# Patient Record
Sex: FEMALE | Race: WHITE | URBAN - NONMETROPOLITAN AREA
[De-identification: names, ages, dates, MRNs, and addresses within clinical notes are randomized per-mention and may not be internally consistent; named-entity substitution may affect disease eponyms.]

---

## 2019-01-30 ENCOUNTER — IMPORTED ENCOUNTER (OUTPATIENT)
Dept: URBAN - NONMETROPOLITAN AREA CLINIC 1 | Facility: CLINIC | Age: 34
End: 2019-01-30

## 2019-01-30 PROBLEM — R79.0: Noted: 2019-01-30

## 2019-01-30 PROCEDURE — 99203 OFFICE O/P NEW LOW 30 MIN: CPT

## 2019-01-30 NOTE — PATIENT DISCUSSION
Pt referred for eval of love fleish ring. Pt has elevated copper blood levels. WSB saw no evidence love fleish ring. Pt happy with current Rx no spectacle Rx dispensed.  Letter sent to Dr. Matilda Zarate 3-4 yrs or prn; 's Notes: Dr. Dalia Giordano [address] [place] [address] [place] [address] [phone]

## 2022-04-10 ASSESSMENT — VISUAL ACUITY
OS_CC: 20/200
OD_SC: 20/25
OS_SC: 20/25

## 2022-04-10 ASSESSMENT — TONOMETRY
OS_IOP_MMHG: 16
OD_IOP_MMHG: 16